# Patient Record
Sex: FEMALE | Race: WHITE | NOT HISPANIC OR LATINO | Employment: FULL TIME | URBAN - METROPOLITAN AREA
[De-identification: names, ages, dates, MRNs, and addresses within clinical notes are randomized per-mention and may not be internally consistent; named-entity substitution may affect disease eponyms.]

---

## 2018-05-16 ENCOUNTER — OFFICE VISIT (OUTPATIENT)
Dept: URGENT CARE | Facility: CLINIC | Age: 48
End: 2018-05-16
Payer: COMMERCIAL

## 2018-05-16 ENCOUNTER — APPOINTMENT (OUTPATIENT)
Dept: LAB | Facility: CLINIC | Age: 48
End: 2018-05-16
Payer: COMMERCIAL

## 2018-05-16 VITALS
OXYGEN SATURATION: 97 % | HEART RATE: 78 BPM | SYSTOLIC BLOOD PRESSURE: 127 MMHG | TEMPERATURE: 96.7 F | DIASTOLIC BLOOD PRESSURE: 58 MMHG | RESPIRATION RATE: 18 BRPM

## 2018-05-16 DIAGNOSIS — R19.7 DIARRHEA, UNSPECIFIED TYPE: ICD-10-CM

## 2018-05-16 DIAGNOSIS — K52.9 AGE (ACUTE GASTROENTERITIS): Primary | ICD-10-CM

## 2018-05-16 DIAGNOSIS — R11.0 NAUSEA: ICD-10-CM

## 2018-05-16 PROCEDURE — G0382 LEV 3 HOSP TYPE B ED VISIT: HCPCS | Performed by: PHYSICIAN ASSISTANT

## 2018-05-16 PROCEDURE — 87505 NFCT AGENT DETECTION GI: CPT

## 2018-05-16 PROCEDURE — S9083 URGENT CARE CENTER GLOBAL: HCPCS | Performed by: PHYSICIAN ASSISTANT

## 2018-05-16 RX ORDER — LEVOTHYROXINE SODIUM 175 UG/1
175 TABLET ORAL
COMMUNITY
Start: 2010-07-21

## 2018-05-16 RX ORDER — ONDANSETRON 4 MG/1
4 TABLET, ORALLY DISINTEGRATING ORAL ONCE
Status: COMPLETED | OUTPATIENT
Start: 2018-05-16 | End: 2018-05-16

## 2018-05-16 RX ORDER — ONDANSETRON 4 MG/1
4 TABLET, ORALLY DISINTEGRATING ORAL EVERY 6 HOURS PRN
Qty: 20 TABLET | Refills: 0 | Status: SHIPPED | OUTPATIENT
Start: 2018-05-16 | End: 2021-02-10

## 2018-05-16 RX ADMIN — ONDANSETRON 4 MG: 4 TABLET, ORALLY DISINTEGRATING ORAL at 09:17

## 2018-05-16 NOTE — PATIENT INSTRUCTIONS
Stay hydrated by taking small sips of water through out the day  Avoid large amounts of water at one time  Advance diet as tolerated starting with crackers, toast   Can use imodium for diarrhea  If you are unable to hold down fluids and feel dehydrated, go to the emergency room  Can take tylenol or ibuprofen as needed for headache, pain, fever  Probiotics which can be found over the counter in pill form or in yogurt, such as activia, would be beneficial to increase normal gut antonieta and help with diarrhea  If symptoms worsen go to the emergency room

## 2018-05-16 NOTE — PROGRESS NOTES
[facility]    NAME: Janice Tolliver is a 49 y o  female  : 1970    MRN: [id number]  DATE: May 16, 2018  TIME: 9:47 AM    Assessment and Plan   AGE (acute gastroenteritis) [K52 9]  1  AGE (acute gastroenteritis)     2  Diarrhea, unspecified type  Stool Enteric Bacterial Panel by PCR    Clostridium difficile toxin by PCR   3  Nausea  ondansetron (ZOFRAN-ODT) 4 mg disintegrating tablet    ondansetron (ZOFRAN-ODT) dispersible tablet 4 mg       Patient Instructions     Patient Instructions   Stay hydrated by taking small sips of water through out the day  Avoid large amounts of water at one time  Advance diet as tolerated starting with crackers, toast   Can use imodium for diarrhea  If you are unable to hold down fluids and feel dehydrated, go to the emergency room  Can take tylenol or ibuprofen as needed for headache, pain, fever  Probiotics which can be found over the counter in pill form or in yogurt, such as activia, would be beneficial to increase normal gut antonieta and help with diarrhea  If symptoms worsen go to the emergency room  Chief Complaint     Chief Complaint   Patient presents with    Vomiting     Pt c/o vomiting since Monday  History of Present Illness   77-year-old female here with complaint of GI symptoms for the last 2-3 days  Patient has had episodes of vomiting and diarrhea  Vomiting is improving but she is still very nauseous  Is able to hold down some fluids  Was exposed to adenovirus and C diff  Her grandson was diagnosed with both  She is concerned that she may also have C diff  Feels feverish and has chills at times  Review of Systems   Review of Systems   Constitutional: Positive for chills, diaphoresis and fever  Negative for activity change, appetite change, fatigue and unexpected weight change     HENT: Negative for congestion, dental problem, hearing loss, sinus pressure, sneezing, sore throat, tinnitus, trouble swallowing and voice change  Eyes: Negative for photophobia, redness and visual disturbance  Respiratory: Negative for apnea, cough, chest tightness, shortness of breath, wheezing and stridor  Cardiovascular: Negative for chest pain, palpitations and leg swelling  Gastrointestinal: Positive for abdominal pain, diarrhea, nausea and vomiting  Negative for abdominal distention, blood in stool and constipation  Endocrine: Negative for cold intolerance, heat intolerance, polydipsia, polyphagia and polyuria  Genitourinary: Negative for difficulty urinating, dysuria, flank pain, frequency, hematuria and urgency  Musculoskeletal: Negative for arthralgias, back pain, gait problem, joint swelling, myalgias, neck pain and neck stiffness  Skin: Negative for pallor, rash and wound  Neurological: Negative for dizziness, tremors, seizures, speech difficulty, weakness and headaches  Hematological: Negative for adenopathy  Does not bruise/bleed easily  Psychiatric/Behavioral: Negative for agitation, confusion, dysphoric mood and sleep disturbance  The patient is not nervous/anxious  All other systems reviewed and are negative  Current Medications     Current Outpatient Prescriptions:     levothyroxine 175 mcg tablet, Take 175 mcg by mouth, Disp: , Rfl:     ondansetron (ZOFRAN-ODT) 4 mg disintegrating tablet, Take 1 tablet (4 mg total) by mouth every 6 (six) hours as needed for nausea or vomiting, Disp: 20 tablet, Rfl: 0  No current facility-administered medications for this visit  Current Allergies     Allergies as of 05/16/2018 - Reviewed 05/16/2018   Allergen Reaction Noted    Sulfa antibiotics Other (See Comments) 10/04/2010          The following portions of the patient's history were reviewed and updated as appropriate: allergies, current medications, past family history, past medical history, past social history, past surgical history and problem list    No past medical history on file    No past surgical history on file  No family history on file  Medications have been verified  Objective   /58   Pulse 78   Temp (!) 96 7 °F (35 9 °C)   Resp 18   SpO2 97%      Physical Exam   Physical Exam   Constitutional: She appears well-developed and well-nourished  No distress  HENT:   Head: Normocephalic  Right Ear: External ear normal    Left Ear: External ear normal    Nose: Nose normal    Mouth/Throat: Oropharynx is clear and moist  No oropharyngeal exudate  Neck: Normal range of motion  Neck supple  Cardiovascular: Normal rate, regular rhythm and normal heart sounds  No murmur heard  Pulmonary/Chest: Effort normal and breath sounds normal  No respiratory distress  She has no wheezes  She has no rales  Abdominal: Soft  Bowel sounds are normal  There is generalized tenderness  Musculoskeletal: Normal range of motion  Lymphadenopathy:     She has no cervical adenopathy  Skin: Skin is warm  No rash noted

## 2018-05-18 LAB
CAMPYLOBACTER DNA SPEC NAA+PROBE: NORMAL
SALMONELLA DNA SPEC QL NAA+PROBE: NORMAL
SHIGA TOXIN STX GENE SPEC NAA+PROBE: NORMAL
SHIGELLA DNA SPEC QL NAA+PROBE: NORMAL

## 2019-12-12 ENCOUNTER — OFFICE VISIT (OUTPATIENT)
Dept: URGENT CARE | Facility: CLINIC | Age: 49
End: 2019-12-12
Payer: COMMERCIAL

## 2019-12-12 ENCOUNTER — APPOINTMENT (OUTPATIENT)
Dept: RADIOLOGY | Facility: CLINIC | Age: 49
End: 2019-12-12
Payer: COMMERCIAL

## 2019-12-12 VITALS
RESPIRATION RATE: 18 BRPM | SYSTOLIC BLOOD PRESSURE: 104 MMHG | HEART RATE: 78 BPM | WEIGHT: 165 LBS | DIASTOLIC BLOOD PRESSURE: 70 MMHG | BODY MASS INDEX: 28.17 KG/M2 | HEIGHT: 64 IN | OXYGEN SATURATION: 99 % | TEMPERATURE: 98 F

## 2019-12-12 DIAGNOSIS — M77.8 LEFT WRIST TENDONITIS: Primary | ICD-10-CM

## 2019-12-12 DIAGNOSIS — M77.8 LEFT WRIST TENDONITIS: ICD-10-CM

## 2019-12-12 PROCEDURE — 99213 OFFICE O/P EST LOW 20 MIN: CPT | Performed by: EMERGENCY MEDICINE

## 2019-12-12 PROCEDURE — 29515 APPLICATION SHORT LEG SPLINT: CPT | Performed by: EMERGENCY MEDICINE

## 2019-12-12 PROCEDURE — 73110 X-RAY EXAM OF WRIST: CPT

## 2019-12-12 NOTE — PATIENT INSTRUCTIONS
1  Motrin 600 mgs every 6 to 8 hours with food, or  2  Aleve:  2 tabs every 12 hours with food  3  Use Ice:  10 mins on 10 mins off three times daily for one hour x 2 days then switch to moist heat  4   No heavy lifting or repetitive movements with the left wrist for one week, elevate  5  See your family doctor  6   Ace wrap for the next 3 days and at night to limit movement  Tendinitis   WHAT YOU NEED TO KNOW:   Tendinitis is painful inflammation or breakdown of your tendons  It may also be called tendinopathy  Tendinitis often occurs in the knee, shoulder, ankle, hip, or elbow  DISCHARGE INSTRUCTIONS:   Medicines:   · Pain medicines  such as acetaminophen or NSAIDs may decrease swelling and pain or fever  These medicines are available without a doctor's order  Ask which medicine to take  Ask how much to take and when to take it  Follow directions  Acetaminophen and NSAIDs can cause liver or kidney damage if not taken correctly  If you take blood thinner medicine, always ask your healthcare provider if NSAIDs are safe for you  Always read the medicine label and follow the directions on it before using these medicine  · Take your medicine as directed  Contact your healthcare provider if you think your medicine is not helping or if you have side effects  Tell him if you are allergic to any medicine  Keep a list of the medicines, vitamins, and herbs you take  Include the amounts, and when and why you take them  Bring the list or the pill bottles to follow-up visits  Carry your medicine list with you in case of an emergency  Management:   · Rest  your tendon as directed to help it heal  Ask your healthcare provider if you need to stop putting weight on your affected area  · Ice  helps decrease swelling and pain  Ice may also help prevent tissue damage  Use an ice pack, or put crushed ice in a plastic bag   Cover it with a towel and place it on the affected area for 10 to 15 minutes every hour or as directed  · Support devices  such as a cane, splint, shoe insert, or brace may help reduce your pain  · Physical therapy  may be ordered by your healthcare provider  This may be used to teach you exercises to help improve movement and strength, and to decrease pain  You may also learn how to improve your posture, and how to lift or exercise correctly  Prevention:   · Stretch and warm up  before you exercise  · Exercise regularly  to strengthen the muscles around your joint  Ease into an exercise routine for the first 3 weeks to prevent another injury  Ask your healthcare provider about the best exercise plan for you  Rest fully between activities  · Use the right equipment  for sports and exercise  Wear braces or tape around weak joints as directed  Follow up with your healthcare provider as directed:  Write down your questions so you remember to ask them during your visits  Contact your healthcare provider if:   · You have increased pain even after you take medicine  · You have questions or concerns about your condition or care  Return to the emergency department if:   · You have increased redness over the joint, or swelling in the joint  · You suddenly cannot move your joint  © 2017 [employer]  Information is for End User's use only and may not be sold, redistributed or otherwise used for commercial purposes  All illustrations and images included in CareNotes® are the copyrighted property of [employer] [facility] [facility] [employer]  or Paolo Farias  The above information is an  only  It is not intended as medical advice for individual conditions or treatments  Talk to your doctor, nurse or pharmacist before following any medical regimen to see if it is safe and effective for you

## 2019-12-12 NOTE — PROGRESS NOTES
[facility]        NAME: Biju Veronica is a 47 y o  female  : 1970    MRN: [id number]  DATE: 2019  TIME: 12:08 PM    Assessment and Plan   Left wrist tendonitis [M77 8]  1  Left wrist tendonitis  XR wrist 3+ vw left     xray wrist:  Negative  Wrist Splint given    Patient Instructions     Patient Instructions   1  Motrin 600 mgs every 6 to 8 hours with food, or  2  Aleve:  2 tabs every 12 hours with food  3  Use Ice:  10 mins on 10 mins off three times daily for one hour x 2 days then switch to moist heat  4   No heavy lifting or repetitive movements with the left wrist for one week, elevate  5  See your family doctor  6   Ace wrap for the next 3 days and at night to limit movement  Tendinitis   WHAT YOU NEED TO KNOW:   Tendinitis is painful inflammation or breakdown of your tendons  It may also be called tendinopathy  Tendinitis often occurs in the knee, shoulder, ankle, hip, or elbow  DISCHARGE INSTRUCTIONS:   Medicines:   · Pain medicines  such as acetaminophen or NSAIDs may decrease swelling and pain or fever  These medicines are available without a doctor's order  Ask which medicine to take  Ask how much to take and when to take it  Follow directions  Acetaminophen and NSAIDs can cause liver or kidney damage if not taken correctly  If you take blood thinner medicine, always ask your healthcare provider if NSAIDs are safe for you  Always read the medicine label and follow the directions on it before using these medicine  · Take your medicine as directed  Contact your healthcare provider if you think your medicine is not helping or if you have side effects  Tell him if you are allergic to any medicine  Keep a list of the medicines, vitamins, and herbs you take  Include the amounts, and when and why you take them  Bring the list or the pill bottles to follow-up visits  Carry your medicine list with you in case of an emergency    Management:   · Rest  your tendon as directed to help it heal  Ask your healthcare provider if you need to stop putting weight on your affected area  · Ice  helps decrease swelling and pain  Ice may also help prevent tissue damage  Use an ice pack, or put crushed ice in a plastic bag  Cover it with a towel and place it on the affected area for 10 to 15 minutes every hour or as directed  · Support devices  such as a cane, splint, shoe insert, or brace may help reduce your pain  · Physical therapy  may be ordered by your healthcare provider  This may be used to teach you exercises to help improve movement and strength, and to decrease pain  You may also learn how to improve your posture, and how to lift or exercise correctly  Prevention:   · Stretch and warm up  before you exercise  · Exercise regularly  to strengthen the muscles around your joint  Ease into an exercise routine for the first 3 weeks to prevent another injury  Ask your healthcare provider about the best exercise plan for you  Rest fully between activities  · Use the right equipment  for sports and exercise  Wear braces or tape around weak joints as directed  Follow up with your healthcare provider as directed:  Write down your questions so you remember to ask them during your visits  Contact your healthcare provider if:   · You have increased pain even after you take medicine  · You have questions or concerns about your condition or care  Return to the emergency department if:   · You have increased redness over the joint, or swelling in the joint  · You suddenly cannot move your joint  © 2017 [employer] Information is for End User's use only and may not be sold, redistributed or otherwise used for commercial purposes  All illustrations and images included in CareNotes® are the copyrighted property of [employer]  or Paolo Farias  The above information is an  only   It is not intended as medical advice for individual conditions or treatments  Talk to your doctor, nurse or pharmacist before following any medical regimen to see if it is safe and effective for you  Follow up with PCP in 3-5 days  Proceed to  ER if symptoms worsen  Chief Complaint     Chief Complaint   Patient presents with    Wrist Pain     lifted a heavy bad and left wrist is swollen and painful         History of Present Illness       This is a 78-year-old female who presents with left wrist pain after picking up a heavy work bag yesterday which contained a laptop  She states that she has had pain and swelling in the lateral wrist throughout the night which kept her awake  Patient did use Motrin 600 mg yesterday  She has had no previous wrist injuries  Review of Systems   Review of Systems   Constitutional: Negative for fever  Musculoskeletal: Positive for arthralgias  Pain in the left wrist laterally near the thumb  Current Medications       Current Outpatient Medications:     levothyroxine 175 mcg tablet, Take 175 mcg by mouth, Disp: , Rfl:     ondansetron (ZOFRAN-ODT) 4 mg disintegrating tablet, Take 1 tablet (4 mg total) by mouth every 6 (six) hours as needed for nausea or vomiting (Patient not taking: Reported on 12/12/2019), Disp: 20 tablet, Rfl: 0    Current Allergies     Allergies as of 12/12/2019 - Reviewed 12/12/2019   Allergen Reaction Noted    Sulfa antibiotics Other (See Comments) 10/04/2010            The following portions of the patient's history were reviewed and updated as appropriate: allergies, current medications, past family history, past medical history, past social history, past surgical history and problem list      History reviewed  No pertinent past medical history  History reviewed  No pertinent surgical history  History reviewed  No pertinent family history  Medications have been verified          Objective   /70   Pulse 78   Temp 98 °F (36 7 °C) (Tympanic)   Resp 18    5' 4" (1 626 m)   Wt 74 8 kg (165 lb)   SpO2 99%   BMI 28 32 kg/m²        Physical Exam     Physical Exam   Constitutional: She is oriented to person, place, and time  She appears well-developed and well-nourished  HENT:   Head: Normocephalic and atraumatic  Nose: Nose normal    Eyes: Pupils are equal, round, and reactive to light  Conjunctivae and EOM are normal    Neck: Normal range of motion  Cardiovascular: Normal rate  Pulmonary/Chest: Effort normal    Musculoskeletal: Normal range of motion  There is tenderness noted and swelling to the lateral left wrist area in the area of the pronator teres muscle  There is pain related to the extensor tendon the thumb on movement of the thumb  Patient has decreased grasp secondary to pain in that area  She has full passive range of motion  There are good radial and ulnar pulses  Neurological: She is alert and oriented to person, place, and time  Skin: Skin is warm and dry  Psychiatric: She has a normal mood and affect  Nursing note and vitals reviewed  Splint application  Date/Time: 12/12/2019 12:06 PM  Performed by: Steve Carmichael MD  Authorized by: Steve Carmichael MD     Consent:     Consent obtained:  Verbal    Consent given by:  Patient    Risks discussed:  Numbness and pain    Alternatives discussed:  Alternative treatment (ace wrap)  Pre-procedure details:     Sensation:  Normal  Procedure details:     Laterality:  Left    Location:  Wrist    Wrist:  L wrist    Strapping: no      Splint type:  Wrist (velcro wrist splint)    Supplies used: velcro

## 2021-02-05 ENCOUNTER — OFFICE VISIT (OUTPATIENT)
Dept: LAB | Facility: HOSPITAL | Age: 51
End: 2021-02-05
Payer: COMMERCIAL

## 2021-02-05 ENCOUNTER — TRANSCRIBE ORDERS (OUTPATIENT)
Dept: ADMINISTRATIVE | Facility: HOSPITAL | Age: 51
End: 2021-02-05

## 2021-02-05 DIAGNOSIS — Z01.810 PRE-OPERATIVE CARDIOVASCULAR EXAMINATION: ICD-10-CM

## 2021-02-05 DIAGNOSIS — D48.5 NEOPLASM OF UNCERTAIN BEHAVIOR OF SKIN: Primary | ICD-10-CM

## 2021-02-05 DIAGNOSIS — Z01.818 PREOPERATIVE EXAMINATION, UNSPECIFIED: ICD-10-CM

## 2021-02-05 DIAGNOSIS — D48.5 NEOPLASM OF UNCERTAIN BEHAVIOR OF SKIN: ICD-10-CM

## 2021-02-05 LAB
ATRIAL RATE: 67 BPM
ATRIAL RATE: 71 BPM
P AXIS: 48 DEGREES
P AXIS: 55 DEGREES
PR INTERVAL: 132 MS
PR INTERVAL: 136 MS
QRS AXIS: 30 DEGREES
QRS AXIS: 31 DEGREES
QRSD INTERVAL: 84 MS
QRSD INTERVAL: 84 MS
QT INTERVAL: 416 MS
QT INTERVAL: 418 MS
QTC INTERVAL: 441 MS
QTC INTERVAL: 452 MS
T WAVE AXIS: 44 DEGREES
T WAVE AXIS: 45 DEGREES
VENTRICULAR RATE: 67 BPM
VENTRICULAR RATE: 71 BPM

## 2021-02-05 PROCEDURE — 93005 ELECTROCARDIOGRAM TRACING: CPT

## 2021-02-05 PROCEDURE — 93010 ELECTROCARDIOGRAM REPORT: CPT | Performed by: INTERNAL MEDICINE

## 2021-02-10 RX ORDER — CEPHALEXIN 500 MG/1
CAPSULE ORAL
COMMUNITY
Start: 2021-02-04

## 2021-02-10 RX ORDER — HYDROCODONE BITARTRATE AND ACETAMINOPHEN 5; 325 MG/1; MG/1
TABLET ORAL
COMMUNITY
Start: 2021-02-04

## 2021-02-10 RX ORDER — ALBUTEROL SULFATE 90 UG/1
2 AEROSOL, METERED RESPIRATORY (INHALATION) EVERY 6 HOURS PRN
COMMUNITY

## 2021-02-10 NOTE — PRE-PROCEDURE INSTRUCTIONS
Pre-Surgery Instructions:   Medication Instructions    levothyroxine 175 mcg tablet take day of surgery    My Surgical Experience    The following information was developed to assist you to prepare for your operation  What do I need to do before coming to the hospital?   Arrange for a responsible person to drive you to and from the hospital    Arrange care for your children at home  Children are not allowed in the recovery areas of the hospital   Plan to wear clothing that is easy to put on and take off  If you are having shoulder surgery, wear a shirt that buttons or zippers in the front  Bathing  o Shower the evening before and the morning of your surgery with an antibacterial soap  Please refer to the Pre Op Showering Instructions for Surgery Patients Sheet   o Remove nail polish and all body piercing jewelry  o Do not shave any body part for at least 24 hours before surgery-this includes face, arms, legs and upper body  Food  o Nothing to eat or drink after midnight the night before your surgery  This includes candy and chewing gum  o Exception: If your surgery is after 12:00pm (noon), you may have clear liquids such as 7-Up®, ginger ale, apple or cranberry juice, Jell-O®, water, or clear broth until 8:00 am  o Do not drink milk or juice with pulp on the morning before surgery  o Do not drink alcohol 24 hours before surgery  Medicine  o Follow instructions you received from your surgeon about which medicines you may take on the day of surgery  o If instructed to take medicine on the morning of surgery, take pills with just a small sip of water  Call your prescribing doctor for specific infroamtion on what to do if you take insulin    What should I bring to the hospital?    Bring:  Virgilio Guillen or a walker, if you have them, for foot or knee surgery   A list of the daily medicines, vitamins, minerals, herbals and nutritional supplements you take   Include the dosages of medicines and the time you take them each day   Glasses, dentures or hearing aids   Minimal clothing; you will be wearing hospital sleepwear   Photo ID; required to verify your identity   If you have a Living Will or Power of , bring a copy of the documents   If you have an ostomy, bring an extra pouch and any supplies you use    Do not bring   Medicines or inhalers   Money, valuables or jewelry    What other information should I know about the day of surgery?  Notify your surgeons if you develop a cold, sore throat, cough, fever, rash or any other illness   Report to the Ambulatory Surgical/Same Day Surgery Unit   You will be instructed to stop at Registration only if you have not been pre-registered   Inform your  fi they do not stay that they will be asked by the staff to leave a phone number where they can be reached   Be available to be reached before surgery  In the event the operating room schedule changes, you may be asked to come in earlier or later than expected    *It is important to tell your doctor and others involved in your health care if you are taking or have been taking any non-prescription drugs, vitamins, minerals, herbals or other nutritional supplements   Any of these may interact with some food or medicines and cause a reaction

## 2021-02-10 NOTE — PRE-PROCEDURE INSTRUCTIONS
Pre-Surgery Instructions:   Medication Instructions    albuterol (PROVENTIL HFA,VENTOLIN HFA) 90 mcg/act inhaler pt uses with allergies    levothyroxine 175 mcg tablet take day of surgery    My Surgical Experience    The following information was developed to assist you to prepare for your operation  What do I need to do before coming to the hospital?   Arrange for a responsible person to drive you to and from the hospital    Arrange care for your children at home  Children are not allowed in the recovery areas of the hospital   Plan to wear clothing that is easy to put on and take off  If you are having shoulder surgery, wear a shirt that buttons or zippers in the front  Bathing  o Shower the evening before and the morning of your surgery with an antibacterial soap  Please refer to the Pre Op Showering Instructions for Surgery Patients Sheet   o Remove nail polish and all body piercing jewelry  o Do not shave any body part for at least 24 hours before surgery-this includes face, arms, legs and upper body  Food  o Nothing to eat or drink after midnight the night before your surgery  This includes candy and chewing gum  o Exception: If your surgery is after 12:00pm (noon), you may have clear liquids such as 7-Up®, ginger ale, apple or cranberry juice, Jell-O®, water, or clear broth until 8:00 am  o Do not drink milk or juice with pulp on the morning before surgery  o Do not drink alcohol 24 hours before surgery  Medicine  o Follow instructions you received from your surgeon about which medicines you may take on the day of surgery  o If instructed to take medicine on the morning of surgery, take pills with just a small sip of water   Call your prescribing doctor for specific infroamtion on what to do if you take insulin    What should I bring to the hospital?    Bring:  Anne Zamora or a walker, if you have them, for foot or knee surgery   A list of the daily medicines, vitamins, minerals, herbals and nutritional supplements you take  Include the dosages of medicines and the time you take them each day   Glasses, dentures or hearing aids   Minimal clothing; you will be wearing hospital sleepwear   Photo ID; required to verify your identity   If you have a Living Will or Power of , bring a copy of the documents   If you have an ostomy, bring an extra pouch and any supplies you use    Do not bring   Medicines or inhalers   Money, valuables or jewelry    What other information should I know about the day of surgery?  Notify your surgeons if you develop a cold, sore throat, cough, fever, rash or any other illness   Report to the Ambulatory Surgical/Same Day Surgery Unit   You will be instructed to stop at Registration only if you have not been pre-registered   Inform your  fi they do not stay that they will be asked by the staff to leave a phone number where they can be reached   Be available to be reached before surgery  In the event the operating room schedule changes, you may be asked to come in earlier or later than expected    *It is important to tell your doctor and others involved in your health care if you are taking or have been taking any non-prescription drugs, vitamins, minerals, herbals or other nutritional supplements   Any of these may interact with some food or medicines and cause a reaction

## 2021-02-11 ENCOUNTER — ANESTHESIA EVENT (OUTPATIENT)
Dept: PERIOP | Facility: HOSPITAL | Age: 51
End: 2021-02-11
Payer: COMMERCIAL

## 2021-02-11 PROBLEM — E03.9 HYPOTHYROIDISM: Status: ACTIVE | Noted: 2021-02-11

## 2021-02-12 ENCOUNTER — HOSPITAL ENCOUNTER (EMERGENCY)
Facility: HOSPITAL | Age: 51
Discharge: HOME | End: 2021-02-12
Attending: EMERGENCY MEDICINE
Payer: COMMERCIAL

## 2021-02-12 ENCOUNTER — HOSPITAL ENCOUNTER (OUTPATIENT)
Facility: HOSPITAL | Age: 51
Setting detail: OUTPATIENT SURGERY
Discharge: HOME | End: 2021-02-12
Attending: SURGERY | Admitting: SURGERY
Payer: COMMERCIAL

## 2021-02-12 ENCOUNTER — ANESTHESIA (OUTPATIENT)
Dept: PERIOP | Facility: HOSPITAL | Age: 51
End: 2021-02-12
Payer: COMMERCIAL

## 2021-02-12 VITALS
HEIGHT: 64 IN | BODY MASS INDEX: 29.01 KG/M2 | RESPIRATION RATE: 14 BRPM | TEMPERATURE: 98.1 F | DIASTOLIC BLOOD PRESSURE: 83 MMHG | OXYGEN SATURATION: 99 % | HEART RATE: 83 BPM | SYSTOLIC BLOOD PRESSURE: 120 MMHG

## 2021-02-12 VITALS — HEART RATE: 103 BPM

## 2021-02-12 DIAGNOSIS — D48.5 NEOPLASM OF UNCERTAIN BEHAVIOR OF SKIN: ICD-10-CM

## 2021-02-12 DIAGNOSIS — R33.9 URINARY RETENTION: Primary | ICD-10-CM

## 2021-02-12 DIAGNOSIS — E65 LOCALIZED ADIPOSITY: ICD-10-CM

## 2021-02-12 DIAGNOSIS — N64.82 HYPOPLASIA OF BREAST: ICD-10-CM

## 2021-02-12 PROBLEM — E88.1 LIPODYSTROPHY: Status: ACTIVE | Noted: 2021-02-12

## 2021-02-12 LAB
BILIRUB UR QL STRIP: NEGATIVE
CLARITY UR: CLEAR
COLOR UR: YELLOW
EXT PREGNANCY TEST URINE: NEGATIVE
EXT. CONTROL: NORMAL
GLUCOSE UR STRIP-MCNC: NEGATIVE MG/DL
HGB UR QL STRIP.AUTO: NEGATIVE
KETONES UR STRIP-MCNC: NEGATIVE MG/DL
LEUKOCYTE ESTERASE UR QL STRIP: NEGATIVE
NITRITE UR QL STRIP: NEGATIVE
PH UR STRIP.AUTO: 6 [PH]
PROT UR STRIP-MCNC: NEGATIVE MG/DL
SP GR UR STRIP.AUTO: 1.02 (ref 1–1.03)
UROBILINOGEN UR QL STRIP.AUTO: 0.2 E.U./DL

## 2021-02-12 PROCEDURE — 88304 TISSUE EXAM BY PATHOLOGIST: CPT | Performed by: PATHOLOGY

## 2021-02-12 PROCEDURE — C1789 PROSTHESIS, BREAST, IMP: HCPCS | Performed by: SURGERY

## 2021-02-12 PROCEDURE — 99283 EMERGENCY DEPT VISIT LOW MDM: CPT

## 2021-02-12 PROCEDURE — 81025 URINE PREGNANCY TEST: CPT | Performed by: SURGERY

## 2021-02-12 PROCEDURE — 81003 URINALYSIS AUTO W/O SCOPE: CPT | Performed by: EMERGENCY MEDICINE

## 2021-02-12 PROCEDURE — 99284 EMERGENCY DEPT VISIT MOD MDM: CPT | Performed by: EMERGENCY MEDICINE

## 2021-02-12 PROCEDURE — 88305 TISSUE EXAM BY PATHOLOGIST: CPT | Performed by: PATHOLOGY

## 2021-02-12 DEVICE — SALINE BREAST IMPLANT WITH DIAPHRAGM VALVE, 450CC  SMOOTH ROUND SALINE
Type: IMPLANTABLE DEVICE | Site: BREAST | Status: FUNCTIONAL
Brand: MENTOR SMOOTH ROUND MODERATE PLUS PROFILE

## 2021-02-12 RX ORDER — BUPIVACAINE HYDROCHLORIDE 5 MG/ML
INJECTION, SOLUTION PERINEURAL AS NEEDED
Status: DISCONTINUED | OUTPATIENT
Start: 2021-02-12 | End: 2021-02-12 | Stop reason: HOSPADM

## 2021-02-12 RX ORDER — DIPHENHYDRAMINE HYDROCHLORIDE 50 MG/ML
12.5 INJECTION INTRAMUSCULAR; INTRAVENOUS ONCE
Status: DISCONTINUED | OUTPATIENT
Start: 2021-02-12 | End: 2021-02-12 | Stop reason: HOSPADM

## 2021-02-12 RX ORDER — PROPOFOL 10 MG/ML
INJECTION, EMULSION INTRAVENOUS CONTINUOUS PRN
Status: DISCONTINUED | OUTPATIENT
Start: 2021-02-12 | End: 2021-02-12

## 2021-02-12 RX ORDER — ROCURONIUM BROMIDE 10 MG/ML
INJECTION, SOLUTION INTRAVENOUS AS NEEDED
Status: DISCONTINUED | OUTPATIENT
Start: 2021-02-12 | End: 2021-02-12

## 2021-02-12 RX ORDER — HYDROCODONE BITARTRATE AND ACETAMINOPHEN 5; 325 MG/1; MG/1
2 TABLET ORAL EVERY 4 HOURS PRN
Status: DISCONTINUED | OUTPATIENT
Start: 2021-02-12 | End: 2021-02-12 | Stop reason: HOSPADM

## 2021-02-12 RX ORDER — FENTANYL CITRATE 50 UG/ML
INJECTION, SOLUTION INTRAMUSCULAR; INTRAVENOUS AS NEEDED
Status: DISCONTINUED | OUTPATIENT
Start: 2021-02-12 | End: 2021-02-12

## 2021-02-12 RX ORDER — LIDOCAINE HYDROCHLORIDE 10 MG/ML
INJECTION, SOLUTION EPIDURAL; INFILTRATION; INTRACAUDAL; PERINEURAL AS NEEDED
Status: DISCONTINUED | OUTPATIENT
Start: 2021-02-12 | End: 2021-02-12

## 2021-02-12 RX ORDER — KETOROLAC TROMETHAMINE 30 MG/ML
30 INJECTION, SOLUTION INTRAMUSCULAR; INTRAVENOUS ONCE
Status: COMPLETED | OUTPATIENT
Start: 2021-02-12 | End: 2021-02-12

## 2021-02-12 RX ORDER — HYDROMORPHONE HCL 110MG/55ML
PATIENT CONTROLLED ANALGESIA SYRINGE INTRAVENOUS AS NEEDED
Status: DISCONTINUED | OUTPATIENT
Start: 2021-02-12 | End: 2021-02-12

## 2021-02-12 RX ORDER — MIDAZOLAM HYDROCHLORIDE 2 MG/2ML
INJECTION, SOLUTION INTRAMUSCULAR; INTRAVENOUS AS NEEDED
Status: DISCONTINUED | OUTPATIENT
Start: 2021-02-12 | End: 2021-02-12

## 2021-02-12 RX ORDER — SODIUM CHLORIDE 9 MG/ML
INJECTION, SOLUTION INTRAVENOUS AS NEEDED
Status: DISCONTINUED | OUTPATIENT
Start: 2021-02-12 | End: 2021-02-12 | Stop reason: HOSPADM

## 2021-02-12 RX ORDER — MAGNESIUM HYDROXIDE 1200 MG/15ML
LIQUID ORAL AS NEEDED
Status: DISCONTINUED | OUTPATIENT
Start: 2021-02-12 | End: 2021-02-12 | Stop reason: HOSPADM

## 2021-02-12 RX ORDER — HYDROCODONE BITARTRATE AND ACETAMINOPHEN 5; 325 MG/1; MG/1
1 TABLET ORAL EVERY 4 HOURS PRN
Status: DISCONTINUED | OUTPATIENT
Start: 2021-02-12 | End: 2021-02-12 | Stop reason: HOSPADM

## 2021-02-12 RX ORDER — CEFAZOLIN SODIUM 1 G/50ML
1000 SOLUTION INTRAVENOUS ONCE
Status: DISCONTINUED | OUTPATIENT
Start: 2021-02-12 | End: 2021-02-12 | Stop reason: HOSPADM

## 2021-02-12 RX ORDER — NEOSTIGMINE METHYLSULFATE 1 MG/ML
INJECTION INTRAVENOUS AS NEEDED
Status: DISCONTINUED | OUTPATIENT
Start: 2021-02-12 | End: 2021-02-12

## 2021-02-12 RX ORDER — PROPOFOL 10 MG/ML
INJECTION, EMULSION INTRAVENOUS AS NEEDED
Status: DISCONTINUED | OUTPATIENT
Start: 2021-02-12 | End: 2021-02-12

## 2021-02-12 RX ORDER — SODIUM CHLORIDE, SODIUM LACTATE, POTASSIUM CHLORIDE, CALCIUM CHLORIDE 600; 310; 30; 20 MG/100ML; MG/100ML; MG/100ML; MG/100ML
50 INJECTION, SOLUTION INTRAVENOUS CONTINUOUS
Status: DISCONTINUED | OUTPATIENT
Start: 2021-02-12 | End: 2021-02-12 | Stop reason: HOSPADM

## 2021-02-12 RX ORDER — CEFAZOLIN SODIUM 1 G/50ML
SOLUTION INTRAVENOUS AS NEEDED
Status: DISCONTINUED | OUTPATIENT
Start: 2021-02-12 | End: 2021-02-12

## 2021-02-12 RX ORDER — VECURONIUM BROMIDE 1 MG/ML
INJECTION, POWDER, LYOPHILIZED, FOR SOLUTION INTRAVENOUS AS NEEDED
Status: DISCONTINUED | OUTPATIENT
Start: 2021-02-12 | End: 2021-02-12

## 2021-02-12 RX ORDER — GINSENG 100 MG
CAPSULE ORAL AS NEEDED
Status: DISCONTINUED | OUTPATIENT
Start: 2021-02-12 | End: 2021-02-12 | Stop reason: HOSPADM

## 2021-02-12 RX ORDER — MINERAL OIL
OIL (ML) MISCELLANEOUS AS NEEDED
Status: DISCONTINUED | OUTPATIENT
Start: 2021-02-12 | End: 2021-02-12 | Stop reason: HOSPADM

## 2021-02-12 RX ORDER — TOBRAMYCIN 3 MG/ML
1 SOLUTION/ DROPS OPHTHALMIC
Status: DISCONTINUED | OUTPATIENT
Start: 2021-02-12 | End: 2021-02-12 | Stop reason: HOSPADM

## 2021-02-12 RX ORDER — HYDROMORPHONE HCL/PF 1 MG/ML
0.5 SYRINGE (ML) INJECTION
Status: DISCONTINUED | OUTPATIENT
Start: 2021-02-12 | End: 2021-02-12 | Stop reason: HOSPADM

## 2021-02-12 RX ORDER — FENTANYL CITRATE/PF 50 MCG/ML
25 SYRINGE (ML) INJECTION
Status: DISCONTINUED | OUTPATIENT
Start: 2021-02-12 | End: 2021-02-12 | Stop reason: HOSPADM

## 2021-02-12 RX ORDER — DEXAMETHASONE SODIUM PHOSPHATE 10 MG/ML
INJECTION, SOLUTION INTRAMUSCULAR; INTRAVENOUS AS NEEDED
Status: DISCONTINUED | OUTPATIENT
Start: 2021-02-12 | End: 2021-02-12

## 2021-02-12 RX ORDER — GLYCOPYRROLATE 0.2 MG/ML
INJECTION INTRAMUSCULAR; INTRAVENOUS AS NEEDED
Status: DISCONTINUED | OUTPATIENT
Start: 2021-02-12 | End: 2021-02-12

## 2021-02-12 RX ORDER — ONDANSETRON 2 MG/ML
INJECTION INTRAMUSCULAR; INTRAVENOUS AS NEEDED
Status: DISCONTINUED | OUTPATIENT
Start: 2021-02-12 | End: 2021-02-12

## 2021-02-12 RX ORDER — SODIUM CHLORIDE, SODIUM LACTATE, POTASSIUM CHLORIDE, CALCIUM CHLORIDE 600; 310; 30; 20 MG/100ML; MG/100ML; MG/100ML; MG/100ML
75 INJECTION, SOLUTION INTRAVENOUS CONTINUOUS
Status: DISCONTINUED | OUTPATIENT
Start: 2021-02-12 | End: 2021-02-12 | Stop reason: HOSPADM

## 2021-02-12 RX ORDER — DIPHENHYDRAMINE HYDROCHLORIDE 50 MG/ML
12.5 INJECTION INTRAMUSCULAR; INTRAVENOUS ONCE AS NEEDED
Status: DISCONTINUED | OUTPATIENT
Start: 2021-02-12 | End: 2021-02-12 | Stop reason: HOSPADM

## 2021-02-12 RX ORDER — LIDOCAINE HYDROCHLORIDE AND EPINEPHRINE 10; 10 MG/ML; UG/ML
INJECTION, SOLUTION INFILTRATION; PERINEURAL AS NEEDED
Status: DISCONTINUED | OUTPATIENT
Start: 2021-02-12 | End: 2021-02-12 | Stop reason: HOSPADM

## 2021-02-12 RX ADMIN — VECURONIUM BROMIDE 2 MG: 1 INJECTION, POWDER, LYOPHILIZED, FOR SOLUTION INTRAVENOUS at 12:45

## 2021-02-12 RX ADMIN — HYDROMORPHONE HYDROCHLORIDE 0.2 MG: 2 INJECTION, SOLUTION INTRAMUSCULAR; INTRAVENOUS; SUBCUTANEOUS at 15:06

## 2021-02-12 RX ADMIN — LIDOCAINE HYDROCHLORIDE 100 MG: 10 INJECTION, SOLUTION EPIDURAL; INFILTRATION; INTRACAUDAL; PERINEURAL at 09:35

## 2021-02-12 RX ADMIN — HYDROMORPHONE HYDROCHLORIDE 0.2 MG: 2 INJECTION, SOLUTION INTRAMUSCULAR; INTRAVENOUS; SUBCUTANEOUS at 12:45

## 2021-02-12 RX ADMIN — HYDROMORPHONE HYDROCHLORIDE 0.2 MG: 2 INJECTION, SOLUTION INTRAMUSCULAR; INTRAVENOUS; SUBCUTANEOUS at 13:00

## 2021-02-12 RX ADMIN — VECURONIUM BROMIDE 6 MG: 1 INJECTION, POWDER, LYOPHILIZED, FOR SOLUTION INTRAVENOUS at 13:00

## 2021-02-12 RX ADMIN — KETOROLAC TROMETHAMINE 30 MG: 30 INJECTION, SOLUTION INTRAMUSCULAR; INTRAVENOUS at 15:50

## 2021-02-12 RX ADMIN — NEOSTIGMINE METHYLSULFATE 4 MG: 1 INJECTION INTRAVENOUS at 14:57

## 2021-02-12 RX ADMIN — FLUORESCEIN SODIUM 1 STRIP: 1 STRIP OPHTHALMIC at 17:28

## 2021-02-12 RX ADMIN — MIDAZOLAM HYDROCHLORIDE 2 MG: 1 INJECTION, SOLUTION INTRAMUSCULAR; INTRAVENOUS at 09:33

## 2021-02-12 RX ADMIN — CEFAZOLIN SODIUM 1000 MG: 1 SOLUTION INTRAVENOUS at 13:40

## 2021-02-12 RX ADMIN — FENTANYL CITRATE 50 MCG: 50 INJECTION INTRAMUSCULAR; INTRAVENOUS at 10:10

## 2021-02-12 RX ADMIN — VECURONIUM BROMIDE 4 MG: 1 INJECTION, POWDER, LYOPHILIZED, FOR SOLUTION INTRAVENOUS at 10:15

## 2021-02-12 RX ADMIN — ROCURONIUM BROMIDE 50 MG: 10 INJECTION, SOLUTION INTRAVENOUS at 09:36

## 2021-02-12 RX ADMIN — FENTANYL CITRATE 50 MCG: 50 INJECTION INTRAMUSCULAR; INTRAVENOUS at 10:50

## 2021-02-12 RX ADMIN — CEFAZOLIN SODIUM 1000 MG: 1 SOLUTION INTRAVENOUS at 09:40

## 2021-02-12 RX ADMIN — HYDROMORPHONE HYDROCHLORIDE 0.4 MG: 2 INJECTION, SOLUTION INTRAMUSCULAR; INTRAVENOUS; SUBCUTANEOUS at 11:16

## 2021-02-12 RX ADMIN — VECURONIUM BROMIDE 4 MG: 1 INJECTION, POWDER, LYOPHILIZED, FOR SOLUTION INTRAVENOUS at 11:30

## 2021-02-12 RX ADMIN — HYDROMORPHONE HYDROCHLORIDE 0.4 MG: 2 INJECTION, SOLUTION INTRAMUSCULAR; INTRAVENOUS; SUBCUTANEOUS at 11:30

## 2021-02-12 RX ADMIN — FENTANYL CITRATE 50 MCG: 50 INJECTION INTRAMUSCULAR; INTRAVENOUS at 09:40

## 2021-02-12 RX ADMIN — VECURONIUM BROMIDE 2 MG: 1 INJECTION, POWDER, LYOPHILIZED, FOR SOLUTION INTRAVENOUS at 14:20

## 2021-02-12 RX ADMIN — HYDROMORPHONE HYDROCHLORIDE 0.4 MG: 2 INJECTION, SOLUTION INTRAMUSCULAR; INTRAVENOUS; SUBCUTANEOUS at 11:01

## 2021-02-12 RX ADMIN — DEXAMETHASONE SODIUM PHOSPHATE 4 MG: 10 INJECTION, SOLUTION INTRAMUSCULAR; INTRAVENOUS at 09:40

## 2021-02-12 RX ADMIN — GLYCOPYRROLATE 0.8 MG: 0.2 INJECTION, SOLUTION INTRAMUSCULAR; INTRAVENOUS at 14:57

## 2021-02-12 RX ADMIN — SODIUM CHLORIDE, SODIUM LACTATE, POTASSIUM CHLORIDE, AND CALCIUM CHLORIDE: .6; .31; .03; .02 INJECTION, SOLUTION INTRAVENOUS at 09:36

## 2021-02-12 RX ADMIN — HYDROMORPHONE HYDROCHLORIDE 0.2 MG: 2 INJECTION, SOLUTION INTRAMUSCULAR; INTRAVENOUS; SUBCUTANEOUS at 14:15

## 2021-02-12 RX ADMIN — TOBRAMYCIN 1 DROP: 3 SOLUTION OPHTHALMIC at 17:29

## 2021-02-12 RX ADMIN — ONDANSETRON HYDROCHLORIDE 4 MG: 2 INJECTION, SOLUTION INTRAMUSCULAR; INTRAVENOUS at 14:49

## 2021-02-12 RX ADMIN — FENTANYL CITRATE 50 MCG: 50 INJECTION INTRAMUSCULAR; INTRAVENOUS at 09:35

## 2021-02-12 RX ADMIN — PROPOFOL 180 MG: 10 INJECTION, EMULSION INTRAVENOUS at 09:36

## 2021-02-12 NOTE — OP NOTE
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]    Procedure(s) (LRB):  ABDOMINOPLASTY (N/A)  AUGMENTATION BREAST (Bilateral)  MASTOPEXY BREAST (Bilateral)  EXCISION LESION OF FOOT (Right)  EXCSION  NECK LESION W/RECONSTRUCTION (N/A)     E/o lesion right foot with complex closure 2 1 cm  E/o lesion neck with complex closure 1 4 cm    Specimen(s):  ID Type Source Tests Collected by Time Destination   1 : Neck cyst Tissue Cyst TISSUE EXAM Es Maier MD 2021 1458    2 : Right foot lesion Tissue Lesion TISSUE EXAM Es Maier MD 2021 1500        Estimated Blood Loss:   50 mL    Drains:  Closed/Suction Drain Right Abdomen Bulb 15 Fr  (Active)   Number of days: 0       Closed/Suction Drain Left Abdomen Bulb 15 Fr  (Active)   Number of days: 0       Urethral Catheter Latex 16 Fr  (Active)   Number of days: 0       Anesthesia Type:   General    Operative Indications:  Localized adiposity [E65]  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]  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]       Operative Findings:  none    Complications:   None    Procedure and Technique:  Patient identified correctly on the table  Intubated by anesthesia  Initially placed in prone position with adequate padding and support  We started by tumesced sing the flank region with 1 L of our of our tumescent solution  We section the flanks with a 3  And 4 cannula for 2 L of fat  Stab incision was closed with 4-0 PDS and Dermabond dressing  We had good symmetry and contour after suction    We then flipped the patient into the supine position re-prepped and draped the neck bilateral breasts abdomen and right foot area  We 1st started with the breast   A crescent mastopexy was performed of bilateral inframammary breast region  There is area that was de-epithelialized measuring 25 x 4 cm  This was used as a an external Zen flap to recreate the inframammary fold which was lost from her prior breast lift from her other surgeon  Once this Zen flap was created the lower tissue and lateral aspect of the breast tissue was removed due to excess tissue in this area and lateral fullness of her axillary and and lateral breast region  Hemostasis was obtained  The Zen flap was then anchored down onto the chest wall with interrupted 0 Prolene sutures  The same procedure was performed on both breast once we had symmetry of the new inframammary fold  We then elevated the pectoralis major muscle up off the chest wall along the inferior margins up to the 3 and 9:00 a m  Positions on right left breast respectively  Hemostasis was obtained  We then went ahead and closed the majority of our incision by suturing the deep fascial layer together using 2-0 Vicryl suture the deep dermis was closed with 2-0 Vicryl 3-0 PDS  Once we had an opening of 4 cm we went ahead irrigated out the breast pockets with saline Betadine solution  A 450 cc moderate profile plus saline implant was placed into both pockets  The implants were then filled to 575 cc on the right a 550 on the left  We had good symmetry after fill  We then went ahead and closed our pockets with deep 2-0 Vicryl sutures the deep dermis of 2-0 Vicryl 3-0 PDS running subcuticular 3-0 Monocryl stitch and Dermabond dressing  The same procedure performed on both breast which really dictated once  We then made incision in the suprapubic line hip to hip  Dissected all way down to the abdominal fascia with electrocautery    The dissected superiorly all way up to the xiphoid process  Left umbilicus circumscribed attached to abdominal wall  We then went ahead and plicated midline abdomen with both interrupted and running 1  Prolene suture  Injected 40 cc of 0 25% Marcaine with epinephrine into the abdominal fascia  20 cc of 0 25% Marcaine was injected into each breast   At this point the excess skin was then pulled inferiorly toward the feet the excess skin was then marked off an removed 15 blade and electrocautery we placed 215 [place] Sophie Jorge drains into the abdominal defect and pulled out through the lateral aspect of our lower incision  Drains were held in place with 3-0 nylon suture  We then went ahead and closed our incision line with 2-0 PDS in 2-0 Vicryl for Tony's  2-0 Vicryl 3-0 PDS for deep dermis and running subcuticular 3-0 Monocryl stitch  The umbilicus was pulled through a new opening made through the abdominal wall this was anchored in place with 2-0 PDS and 4-0 chromic sutures  After this was done we then went ahead to the neck to the neck lesion was excised off elliptical fashion sent off to pathology as specimen we undermined 2 lateral edges significantly to allow for tension-free closure this was then closed with 4-0 PDS and 6 0 Prolene sutures  A 10 our attention next to the lesion on the right dorsal foot  This was excised off also elliptical fashion sent off to pathology as specimen we went ahead and undermined 2 lateral edges significantly to allow for tension-free closure this was then closed with deep 4-0 PDS and 4-0 Prolene sutures  Patient was then dressed with antibiotic ointment and Band-Aid for the neck in foot the abdomen and breasts were dressed with Dermabond and placed into a surgical bra  Patient was then awakened surgery taken recovery room stable condition  All counts correct x2         I was present for the entire procedure    Patient Disposition:  PACU     SIGNATURE: Aidan Nicholson MD  DATE: February 12, 2021  TIME: 3:11 PM

## 2021-02-12 NOTE — INTERVAL H&P NOTE
H&P reviewed  After examining the patient I find no changes in the patients condition since the H&P had been written      Vitals:    02/12/21 0702   BP: 118/70   Pulse: 84   Resp: 20   Temp: 97 8 °F (36 6 °C)   SpO2: 98%

## 2021-02-12 NOTE — NURSING NOTE
Drain care with patient and   Knows how to empty, activate and measure drainage  Drain record sent home with patient

## 2021-02-12 NOTE — NURSING NOTE
Now complaint that she can not void even though it was explained to her that she will not have to void until later tonight because she had a lunsford catheter in during surgery and that her bladder is empty  "I know my body and I am retaining urine"  Will obtain bladder scanner to check for urinary retention

## 2021-02-12 NOTE — ANESTHESIA POSTPROCEDURE EVALUATION
Called to evaluate pt c/o right eye "scratchy"  Pt seen, states her right eye is "scratchy" no pain, no watery, and no vision change  Fluorescein stain - negative  Most likely corneal irritation having dry eyes  Start pt on Tobrex qid, if improves in 24 hr pt will continue for more days,  If no improvement she will need to be evaluated by Ophthalmologist   Pt understood and agreed with plan

## 2021-02-12 NOTE — ANESTHESIA PREPROCEDURE EVALUATION
Procedure:  ABDOMINOPLASTY (N/A Abdomen)  AUGMENTATION BREAST (Bilateral Breast)  MASTOPEXY BREAST (Bilateral Breast)  EXCISION LESION OF FOOT (Right Foot)  EXCSION  NECK LESION W/RECONSTRUCTION (N/A Neck)    Relevant Problems   ANESTHESIA  urinary incontinence per pt      CARDIO   (-) Chest pain      ENDO   (+) Hypothyroidism      GI/HEPATIC   (-) Gastroesophageal reflux disease      PULMONARY   (-) Asthma   (-) Smoking   (-) URI (upper respiratory infection)        Physical Exam    Airway    Mallampati score: II  TM Distance: >3 FB  Neck ROM: full     Dental       Cardiovascular  Cardiovascular exam normal    Pulmonary  Pulmonary exam normal     Other Findings        Anesthesia Plan  ASA Score- 2     Anesthesia Type- general with ASA Monitors  Additional Monitors:   Airway Plan: ETT  Plan Factors-    Chart reviewed  Induction- intravenous  Postoperative Plan-     Informed Consent- Anesthetic plan and risks discussed with patient  I personally reviewed this patient with the CRNA  Discussed and agreed on the Anesthesia Plan with the CRNA  Nury Nguyen

## 2021-02-12 NOTE — NURSING NOTE
Bladder scan was done as best as could be done due to surgical incision on abdomen  No retention noted  Patient still insists that she has to void  Ambulated to the bathroom with supervision of staff

## 2021-02-12 NOTE — NURSING NOTE
Voided a very small amount  " Instructed to drink more fluids at home  "I will go soon and when I do it will be a lot"  Ready for discharge  Instructions were given  Left via wheelchair with volunteer

## 2021-02-12 NOTE — NURSING NOTE
Received from PACU at 1614  Alert  Pain 7/10 bilateral breasts due to surgery  Surgical bra in place  Declined pain medication offer  Incisions intact with surgical glue  No redness or drainage  Incision on abdomen clean dry and intact with surgical glue  BRYAN's (2) in place abdominal area  Right neck with small incision intact with surgical glue  No drainage noted  Right foot Band-aid dry and intact  C/O nausea  Lavender patch used  Order obtained for Benadryl 12 5mg IV for nausea  When taken to her room she refused  Respirations easy and non labored  IV fluids continue  Call bell in reach

## 2021-02-12 NOTE — DISCHARGE INSTRUCTIONS
[address], [address] [employer] [facility], [address] [place] [address], [facility], [address] [facility] /T / [facility]  [facility] [employer] for the Breast Reduction/Mastopexy Patient     Please call the office today to schedule a post operative appointment, and tell the office staff  that you doctor needs see you in our office in 7-10 days  1  You may remove all dressing in the morning and shower, unless you have a bolster attached to your nipple area  If you have a bolster around the nipple region, you will be restricted to sponge bath until the bolster is removed  2  Do not bend, lift or strain for 2 weeks following surgery  3  Do not drive a car until instructed to do so  4  You should avoid lifting or extending your arms above shoulder level  5  You may shower unless otherwise instructed  6  When you arrive home you should remain relaxed  Short walks are permitted after the first week  You should not do any strenuous activities such as cleaning, exercising or shopping until instructed  7  If you have small children someone should help you with   8  You should make arrangements to be off from work at least two weeks following surgery  9  You will be given a prescription for a pain medicine  You can use extra strength Tylenol as a substitute  10  We have spent considerable time and effort to make your surgical experience as efficient and pleasant as possible  We would appreciate your suggestions regarding any area of your care, which you think, could be improved to make your experience more pleasant  If you have any questions, please call the office between 9:00 A  M  and 5:00 P  M       If a problem should arise after hours, the doctor can be reached through the answering service at [phone]      [address] [employer], [address] [employer] [facility], [address], [facility], [address] [facility]   P /W / [facility]        Home Instructions for the following procedures:   Abdominoplasty, Bodylift, Panniculectomy, Thigh lift       Please call the office today to schedule a post operative visit, and tell the office staff that your doctor needs to see you in our office in 7-10 days  1  Use your pain medication as instructed  Pain medication may cause constipation  If this happens you can take something over the counter such as Senakot, or colace to help with that  You want to make sure that you take the pain medication with food, to avoid getting an upset stomach  Do not drink alcohol while taking the pain medications  2  Call your doctor if you notice any excessive swelling or bleeding  3  Exercise calf muscles every hour  Extend your foot up and own at the ankle several times  Getting our of bed after surgery and walking is important to your health  4  Elevate both the head and foot of the bed with two to three pillows  This will relieve tension on the suture line  Many patients choose to recover in a recliner for the first few days to help with putting strain on the abdominal area  5  You may feel the need to walk in the bent over position  You may stand up straight  It is not uncommon to develop low back pain when you walk in the bent-over position  This discomfort should subside once you are standing upright again  6  Drink plenty of liquids  7  Do not smoke  8  Do not lift anything that weights more then 10 pounds for about 4 weeks after the day of your surgery unless instructed by your doctor  9  You may remove dressings and shower the next day unless otherwise instructed by your doctor  Continue wearing your binder and/or garment at all times, except when showering or washing the garment  If you are wearing a binder, sometimes patients are more comfortable wearing an old T-shirt underneath the binder      10  If you have drains, leaking around the drain site may occur  This is normal  you may apply a gauze sponge around the area to help with the drainage  The hospital staff will show you how to take care of the drains  Also, you will need to keep a record of the amount of drainage from each drain  11  It is not uncommon to have some serous fluid (yellowish, straw colored) drainage from your incisions  Do not hesitate to call the office at [phone] if you have any questions about your surgery  The nursing staff will be glad to assist you in any possible way  If it is necessary for you to contract a doctor when the office is closed or on the weekend, please call [phone] and it will direct you to the answering service  A physician will contact you to assist you with any problems or questions

## 2021-02-12 NOTE — NURSING NOTE
C/o right eye feeling "scratchy"  No redness or teary  Does not have any light sensitivity  Dr Bridget Smith aware of same  Here to assess her now

## 2021-02-12 NOTE — NURSING NOTE
Tobradex eye drop to right eye  Will continue to do drops at home for 24 hours  Instructed to make appointment with her eye doctor if it does not seem better

## 2021-02-12 NOTE — DISCHARGE SUMMARY
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]  Principal Procedure:   ABDOMINOPLASTY (N/A Abdomen)  AUGMENTATION BREAST (Bilateral Breast)  MASTOPEXY BREAST (Bilateral Breast)  EXCISION LESION OF FOOT (Right Foot)  EXCSION  NECK LESION W/RECONSTRUCTION (N/A Neck)  Discharge Medications:  See after visit summary for reconciled discharge medications provided to patient and family  Reason for Admission:  Yanci Leung was electively admitted to undergo the above named procedure on 2/12/2021 as an outpatient  Hospital Course:  Patient underwent the above named procedure on the day of admission without complications  They were discharged home the same day  Disposition:  To home in care of self and family    Condition:  Good  Follow up:  Patient with follow up in the office with Dr Mario Sadler MD in 1 week(s) or as scheduled per his office  Mario Sadler MD  2/12/2021 9:22 AM

## 2021-02-13 NOTE — ED PROVIDER NOTES
History  Chief Complaint   Patient presents with    Urinary Retention     Pt had reconstructive breast sx today, U  cath pulled at approx 1530, reports has not voided since  Reports sensation of bladder distention  This is a 44-year-old female who complains of inability to urinate  Patient underwent multiple plastic surgeries today requiring a Gaviria catheter during the procedure  Patient had fully DCD at around 3:30 a m  Eda Sequin Until her arrival in the emergency department she was essentially unable to void but had an increasing suprapubic pressure and urge to void  She has previously had incontinence after her surgeries and thinks that she was given a medicine to help with that which may be preventing her from urinating  She has had no other issues with urinary retention  She contacted her plastic surgeon who recommended that she come into the emergency department for Gaviria catheter  She has no other complaints  She says that her abdomen is not especially uncomfortable, no unusual bleeding or dehiscence of the any of the surgical sites  Otherwise feels well  She notes that she was given Ancef prior to the procedure and is on Keflex as prescribed by her surgeon  Prior to Admission Medications   Prescriptions Last Dose Informant Patient Reported? Taking?    HYDROcodone-acetaminophen (NORCO) 5-325 mg per tablet   Yes No   albuterol (PROVENTIL HFA,VENTOLIN HFA) 90 mcg/act inhaler   Yes No   Sig: Inhale 2 puffs every 6 (six) hours as needed for wheezing   cephalexin (KEFLEX) 500 mg capsule   Yes No   levothyroxine 175 mcg tablet   Yes No   Sig: Take 175 mcg by mouth      Facility-Administered Medications: None       Past Medical History:   Diagnosis Date    Allergies     Cardiac murmur     Disease of thyroid gland     Vertigo        Past Surgical History:   Procedure Laterality Date    APPENDECTOMY      1985    BREAST SURGERY      2018- lift    PELVIC FLOOR REPAIR      2010       History reviewed  No pertinent family history  I have reviewed and agree with the history as documented  E-Cigarette/Vaping    E-Cigarette Use Never User      E-Cigarette/Vaping Substances     Social History     Tobacco Use    Smoking status: Current Every Day Smoker     Packs/day: 0 50    Smokeless tobacco: Never Used    Tobacco comment: not interested in smoking cessation   Substance Use Topics    Alcohol use: Yes     Comment: occassional    Drug use: Never       Review of Systems   Constitutional: Negative for activity change, chills and fever  Cardiovascular: Negative for chest pain  Gastrointestinal: Negative for abdominal pain and vomiting  Genitourinary: Positive for decreased urine volume  Skin: Negative for rash  Neurological: Negative for dizziness, weakness and numbness  Physical Exam  Physical Exam  Constitutional:       Appearance: She is well-developed  HENT:      Head: Normocephalic and atraumatic  Eyes:      Conjunctiva/sclera: Conjunctivae normal       Pupils: Pupils are equal, round, and reactive to light  Neck:      Musculoskeletal: Normal range of motion and neck supple  Cardiovascular:      Rate and Rhythm: Normal rate and regular rhythm  Heart sounds: Normal heart sounds  Pulmonary:      Effort: Pulmonary effort is normal  No respiratory distress  Breath sounds: Normal breath sounds  Abdominal:      General: Bowel sounds are normal  There is no distension  Palpations: Abdomen is soft  Tenderness: There is no abdominal tenderness  There is no guarding or rebound  Musculoskeletal: Normal range of motion  Skin:     General: Skin is warm and dry  Capillary Refill: Capillary refill takes less than 2 seconds  Comments: Multiple areas of incision are clean dry and intact  Left BRYAN has approximately 30 mL of sanguinous output  No areas of dehiscence  Neurological:      Mental Status: She is alert and oriented to person, place, and time  Psychiatric:         Behavior: Behavior normal          Vital Signs  ED Triage Vitals [02/12/21 2058]   Temperature Pulse Respirations Blood Pressure SpO2   98 1 °F (36 7 °C) (!) 110 20 155/79 98 %      Temp Source Heart Rate Source Patient Position - Orthostatic VS BP Location FiO2 (%)   Temporal -- -- -- --      Pain Score       No Pain           Vitals:    02/12/21 2058 02/12/21 2152   BP: 155/79 120/83   Pulse: (!) 110 83         Visual Acuity      ED Medications  Medications - No data to display    Diagnostic Studies  Results Reviewed     Procedure Component Value Units Date/Time    UA w Reflex to Microscopic w Reflex to Culture [[id number]]  (Normal) Collected: 02/12/21 2133    Lab Status: Final result Specimen: Urine, Indwelling Gaviria Catheter Updated: 02/12/21 2141     Color, UA Yellow     Clarity, UA Clear     Specific Gravity, UA 1 025     pH, UA 6 0     Leukocytes, UA Negative     Nitrite, UA Negative     Protein, UA Negative mg/dl      Glucose, UA Negative mg/dl      Ketones, UA Negative mg/dl      Urobilinogen, UA 0 2 E U /dl      Bilirubin, UA Negative     Blood, UA Negative                 No orders to display              Procedures  Procedures         ED Course                                           MDM  Number of Diagnoses or Management Options  Urinary retention: new and requires workup  Diagnosis management comments: This is a 62-year-old female with urinary retention after having surgery earlier today  Discussed the case with performing surgeon  Gaviria catheter inserted which immediately release nearly 500 mL  Patient had relief of suprapubic pressure after insertion of Gaviria catheter  Patient appeared clinically well  Tachycardia resolved after Gaviria catheter insertion  Discussed warning signs and symptoms with the patient as well as when to return to the emergency department versus follow up with [facility]  Patient states understanding and agreement with the plan          This note was completed using dictation software  Amount and/or Complexity of Data Reviewed  Clinical lab tests: ordered and reviewed        Disposition  Final diagnoses:   Urinary retention     Time reflects when diagnosis was documented in both MDM as applicable and the Disposition within this note     Time User Action Codes Description Comment    2/12/2021  9:29 PM Suze Churchill Add [R33 9] Urinary retention       ED Disposition     ED Disposition Condition Date/Time Comment    Discharge Stable Fri Feb 12, 2021  9:29 PM Genaro Jimmy discharge to home/self care  Follow-up Information     Follow up With Specialties Details Why [facility]   [address]  Parag Gonzáles 107      Guanaco Javed MD Plastic Surgery In 1 day  [address] [place] [address]  [phone]            Discharge Medication List as of 2/12/2021  9:30 PM      CONTINUE these medications which have NOT CHANGED    Details   albuterol (PROVENTIL HFA,VENTOLIN HFA) 90 mcg/act inhaler Inhale 2 puffs every 6 (six) hours as needed for wheezing, Historical Med      cephalexin (KEFLEX) 500 mg capsule Starting Thu 2/4/2021, Historical Med      HYDROcodone-acetaminophen (NORCO) 5-325 mg per tablet Starting Thu 2/4/2021, Historical Med      levothyroxine 175 mcg tablet Take 175 mcg by mouth, Starting Wed 7/21/2010, Historical Med           No discharge procedures on file      PDMP Review     None          ED Provider  Electronically Signed by           Reid Jose MD  02/12/21 6610

## 2021-02-13 NOTE — ED NOTES
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

## 2021-02-15 NOTE — ANESTHESIA POSTPROCEDURE EVALUATION
Post-Op Assessment Note    No complications documented      BP      Temp     Pulse     Resp      SpO2

## 2021-02-16 VITALS
BODY MASS INDEX: 28.85 KG/M2 | DIASTOLIC BLOOD PRESSURE: 85 MMHG | WEIGHT: 169 LBS | OXYGEN SATURATION: 96 % | TEMPERATURE: 97.4 F | RESPIRATION RATE: 16 BRPM | SYSTOLIC BLOOD PRESSURE: 140 MMHG | HEART RATE: 79 BPM | HEIGHT: 64 IN

## (undated) DEVICE — SUT ETHILON 3-0 PS-1 18 IN 1663G

## (undated) DEVICE — SYRINGE 10ML LL

## (undated) DEVICE — INTENDED FOR TISSUE SEPARATION, AND OTHER PROCEDURES THAT REQUIRE A SHARP SURGICAL BLADE TO PUNCTURE OR CUT.: Brand: BARD-PARKER ® SAFETYLOCK CARBON RIB-BACK BLADES

## (undated) DEVICE — CANNISTER WASTE IMPLOSION PROOF

## (undated) DEVICE — CRADLE EXTREMITY UNIVERSAL CONTOURED

## (undated) DEVICE — BASIC PACK: Brand: CONVERTORS

## (undated) DEVICE — FLEXIBLE ADHESIVE BANDAGE,X-LARGE: Brand: CURITY

## (undated) DEVICE — PAD GROUNDING ADULT

## (undated) DEVICE — LIGHT GLOVE GREEN

## (undated) DEVICE — SUT VICRYL 2-0 CT-1 27 IN J259H

## (undated) DEVICE — ADHESIVE SKIN HIGH VISCOSITY EXOFIN 1ML

## (undated) DEVICE — TUBING SUCTION 5MM X 12 FT

## (undated) DEVICE — SUT MONOCRYL 4-0 P-3 18 IN Y494G

## (undated) DEVICE — INTENT TO BE USED WITH SUTURE MATERIAL FOR TISSUE CLOSURE: Brand: RICHARD-ALLAN® NEEDLE 1/2 CIRCLE TAPER

## (undated) DEVICE — DRAIN HUBLESS 15FR 3 1/16IN

## (undated) DEVICE — NEEDLE 25G X 1 1/2

## (undated) DEVICE — 3M™ DURAPORE™ SURGICAL TAPE 1538-3, 3 INCH X 10 YARD (7,5CM X 9,1M), 4 ROLLS/BOX: Brand: 3M™ DURAPORE™

## (undated) DEVICE — STANDARD SURGICAL GOWN, L: Brand: CONVERTORS

## (undated) DEVICE — UNDYED MONOFILAMENT (POLYDIOXANONE), ABSORBABLE SURGICAL SUTURE: Brand: PDS

## (undated) DEVICE — SKIN MARKER DUAL TIP WITH RULER CAP, FLEXIBLE RULER AND LABELS: Brand: DEVON

## (undated) DEVICE — BULB SYRINGE,IRRIGATION WITH PROTECTIVE CAP: Brand: DOVER

## (undated) DEVICE — CHEST/BREAST DRAPE: Brand: CONVERTORS

## (undated) DEVICE — 40529 DERMAPROX PAD 11'' X 15'' X 1'': Brand: 40529 DERMAPROX PAD 11'' X 15'' X 1''

## (undated) DEVICE — SINGLE PORT MANIFOLD: Brand: NEPTUNE 2

## (undated) DEVICE — SUT PROLENE 6-0 P-1 18 IN 8697G

## (undated) DEVICE — ELECTRODE NEEDLE MOD E-Z CLEAN 2.75IN 7CM -0013M

## (undated) DEVICE — SUT MONOCRYL 3-0 PS-2 27 IN Y427H

## (undated) DEVICE — NEEDLE BLUNT 18 G X 1 1/2IN

## (undated) DEVICE — KIT EXPANDER BRST UNIVERSAL FILL

## (undated) DEVICE — POV-IOD SOLUTION 4OZ BT

## (undated) DEVICE — 1820 FOAM BLOCK NEEDLE COUNTER: Brand: DEVON

## (undated) DEVICE — TUBING INFILTRATION 9FT

## (undated) DEVICE — SCD SEQUENTIAL COMPRESSION COMFORT SLEEVE MEDIUM KNEE LENGTH: Brand: KENDALL SCD

## (undated) DEVICE — GLOVE SRG LF STRL BGL SKNSNS 6.5 PF

## (undated) DEVICE — STERILE POLYISOPRENE POWDER-FREE SURGICAL GLOVES: Brand: PROTEXIS

## (undated) DEVICE — SUT PROLENE 4-0 PS-2 18 IN 8682G

## (undated) DEVICE — PENCIL ELECTROSURG E-Z CLEAN -0035H

## (undated) DEVICE — SUT CHROMIC 4-0 PS-2 18 IN 1637G

## (undated) DEVICE — SUT PROLENE 0 CT-1 30 IN 8424H

## (undated) DEVICE — ELECTRODE BLADE MOD E-Z CLEAN 2.5IN 6.4CM -0012M

## (undated) DEVICE — DRAPE TOWEL: Brand: CONVERTORS

## (undated) DEVICE — PACK UNIVERSAL DRAPES SUB-Q ICD

## (undated) DEVICE — SPONGE STICK WITH PVP-I: Brand: KENDALL

## (undated) DEVICE — STERILE POLYISOPRENE POWDER-FREE SURGICAL GLOVES WITH EMOLLIENT COATING: Brand: PROTEXIS

## (undated) DEVICE — SUT PDS II 2-0 CT-1 27 IN Z339H

## (undated) DEVICE — SUT PROLENE 1 CT-1 30 IN 8425H

## (undated) DEVICE — ASTOUND STANDARD SURGICAL GOWN, XXL: Brand: CONVERTORS

## (undated) DEVICE — PENCIL SMOKE EVAC TELESCOPING W/TUBING

## (undated) DEVICE — JACKSON-PRATT 100CC BULB RESERVOIR: Brand: CARDINAL HEALTH

## (undated) DEVICE — SPONGE 4 X 4 XRAY 16 PLY STRL LF RFD

## (undated) DEVICE — TRAY FOLEY 16FR URIMETER SURESTEP

## (undated) DEVICE — TUBING LIPOSUCTION ASPIRATION 12FT STERILE

## (undated) DEVICE — ABDOMINAL PAD: Brand: DERMACEA

## (undated) DEVICE — SPONGE LAP 18 X 18 IN STRL RFD